# Patient Record
Sex: MALE | Race: WHITE | Employment: FULL TIME | URBAN - NONMETROPOLITAN AREA
[De-identification: names, ages, dates, MRNs, and addresses within clinical notes are randomized per-mention and may not be internally consistent; named-entity substitution may affect disease eponyms.]

---

## 2021-09-05 ENCOUNTER — HOSPITAL ENCOUNTER (EMERGENCY)
Age: 26
Discharge: HOME | End: 2021-09-05
Payer: COMMERCIAL

## 2021-09-05 VITALS
HEIGHT: 71 IN | SYSTOLIC BLOOD PRESSURE: 142 MMHG | TEMPERATURE: 99 F | OXYGEN SATURATION: 100 % | HEART RATE: 75 BPM | BODY MASS INDEX: 33.6 KG/M2 | DIASTOLIC BLOOD PRESSURE: 89 MMHG | WEIGHT: 240 LBS | RESPIRATION RATE: 18 BRPM

## 2021-09-05 DIAGNOSIS — L03.115 CELLULITIS OF RIGHT LOWER EXTREMITY: Primary | ICD-10-CM

## 2021-09-05 PROCEDURE — 6370000000 HC RX 637 (ALT 250 FOR IP): Performed by: NURSE PRACTITIONER

## 2021-09-05 PROCEDURE — 99283 EMERGENCY DEPT VISIT LOW MDM: CPT | Performed by: NURSE PRACTITIONER

## 2021-09-05 RX ORDER — IBUPROFEN 800 MG/1
800 TABLET ORAL EVERY 8 HOURS PRN
Qty: 30 TABLET | Refills: 0 | Status: SHIPPED | OUTPATIENT
Start: 2021-09-05

## 2021-09-05 RX ORDER — CEPHALEXIN 250 MG/1
500 CAPSULE ORAL ONCE
Status: COMPLETED | OUTPATIENT
Start: 2021-09-05 | End: 2021-09-05

## 2021-09-05 RX ORDER — SULFAMETHOXAZOLE AND TRIMETHOPRIM 800; 160 MG/1; MG/1
1 TABLET ORAL 2 TIMES DAILY
Qty: 20 TABLET | Refills: 0 | Status: SHIPPED | OUTPATIENT
Start: 2021-09-05 | End: 2021-09-15

## 2021-09-05 RX ORDER — SULFAMETHOXAZOLE AND TRIMETHOPRIM 800; 160 MG/1; MG/1
1 TABLET ORAL ONCE
Status: COMPLETED | OUTPATIENT
Start: 2021-09-05 | End: 2021-09-05

## 2021-09-05 RX ORDER — TRAMADOL HYDROCHLORIDE 50 MG/1
50 TABLET ORAL EVERY 6 HOURS PRN
Qty: 10 TABLET | Refills: 0 | Status: SHIPPED | OUTPATIENT
Start: 2021-09-05 | End: 2021-09-08

## 2021-09-05 RX ORDER — CEPHALEXIN 500 MG/1
500 CAPSULE ORAL 4 TIMES DAILY
Qty: 28 CAPSULE | Refills: 0 | Status: SHIPPED | OUTPATIENT
Start: 2021-09-05 | End: 2021-09-12

## 2021-09-05 RX ADMIN — CEPHALEXIN 500 MG: 250 CAPSULE ORAL at 23:53

## 2021-09-05 RX ADMIN — SULFAMETHOXAZOLE AND TRIMETHOPRIM 1 TABLET: 800; 160 TABLET ORAL at 23:53

## 2021-09-05 ASSESSMENT — PAIN DESCRIPTION - DESCRIPTORS: DESCRIPTORS: TIGHTNESS

## 2021-09-05 ASSESSMENT — PAIN DESCRIPTION - ORIENTATION: ORIENTATION: LOWER

## 2021-09-05 ASSESSMENT — PAIN DESCRIPTION - PAIN TYPE: TYPE: ACUTE PAIN

## 2021-09-05 ASSESSMENT — PAIN SCALES - GENERAL: PAINLEVEL_OUTOF10: 6

## 2021-09-05 ASSESSMENT — PAIN DESCRIPTION - LOCATION: LOCATION: LEG

## 2021-09-06 NOTE — ED TRIAGE NOTES
Pt presents to the ED through triage with c/c insect bite on right lower leg. Pt states the he first noticed the pain yesterday but today, noticed the swelling and tightness. Pt rates pain 6/10. Vitals stable.
Samples Given: CeraVe moisturizer and cleanser
Detail Level: Zone

## 2021-09-09 ASSESSMENT — ENCOUNTER SYMPTOMS
BACK PAIN: 0
VOMITING: 0
EYE REDNESS: 0
ABDOMINAL PAIN: 0
COUGH: 0
COLOR CHANGE: 1
NAUSEA: 0
RHINORRHEA: 0
CHEST TIGHTNESS: 0

## 2021-09-09 NOTE — ED PROVIDER NOTES
[facility] Emergency Department    CHIEF COMPLAINT       Chief Complaint   Patient presents with   Nedra Felix 82       Nurses Notes reviewed and I agree except as noted in the HPI. HISTORY OF PRESENT ILLNESS    Mariano Luis casimiro 31 y.o. male who presents to the ED for evaluation of redness and discomfort to the right shin. The patient is thought maybe he got bit by something because the area is red and warm. No fevers or chills. Some pain in the area. HPI was provided by the patient    REVIEW OF SYSTEMS     Review of Systems   Constitutional: Negative for chills, fatigue and fever. HENT: Negative for congestion, ear discharge, ear pain, postnasal drip and rhinorrhea. Eyes: Negative for redness. Respiratory: Negative for cough and chest tightness. Cardiovascular: Positive for leg swelling. Negative for chest pain. Gastrointestinal: Negative for abdominal pain, nausea and vomiting. Genitourinary: Negative for difficulty urinating, dysuria, enuresis, flank pain and hematuria. Musculoskeletal: Negative for back pain and joint swelling. Skin: Positive for color change and wound. Negative for rash. Neurological: Negative for dizziness, light-headedness, numbness and headaches. Psychiatric/Behavioral: Negative for agitation, behavioral problems and confusion. All other systems negative except as noted. PAST MEDICAL HISTORY     Past Medical History:   Diagnosis Date    Pneumonia        SURGICALHISTORY      has a past surgical history that includes Tympanostomy tube placement. CURRENT MEDICATIONS       Discharge Medication List as of 9/5/2021 11:44 PM      CONTINUE these medications which have NOT CHANGED    Details   Phenylephrine-DM-GG (ROBITUSSIN COUGH/COLD CF) 5- MG/5ML LIQD 2 teaspoon every 6 hours when necessary, Disp-120 mL, R-0             ALLERGIES     has No Known Allergies. FAMILY HISTORY     has no family status information on file.     family history is not on file. SOCIAL HISTORY       Social History     Socioeconomic History    Marital status: Single     Spouse name: Not on file    Number of children: Not on file    Years of education: Not on file    Highest education level: Not on file   Occupational History    Not on file   Tobacco Use    Smoking status: Never Smoker   Substance and Sexual Activity    Alcohol use: No    Drug use: No    Sexual activity: Yes   Other Topics Concern    Not on file   Social History Narrative    Not on file     Social Determinants of Health     Financial Resource Strain:     Difficulty of Paying Living Expenses:    Food Insecurity:     Worried About Running Out of Food in the Last Year:     [address] in the Last Year:    Transportation Needs:     Lack of Transportation (Medical):  Lack of Transportation (Non-Medical):    Physical Activity:     Days of Exercise per Week:     Minutes of Exercise per Session:    Stress:     Feeling of Stress :    Social Connections:     Frequency of Communication with Friends and Family:     Frequency of Social Gatherings with Friends and Family:     Attends Latter day Services:     Active Member of Clubs or Organizations:     Attends Club or Organization Meetings:     Marital Status:    Intimate Partner Violence:     Fear of Current or Ex-Partner:     Emotionally Abused:     Physically Abused:     Sexually Abused:        PHYSICAL EXAM     INITIAL VITALS:  height is 5' 11\" (1.803 m) and weight is 240 lb (108.9 kg). His oral temperature is 99 °F (37.2 °C). His blood pressure is 142/89 (abnormal) and his pulse is 75. His respiration is 18 and oxygen saturation is 100%. Physical Exam  Vitals and nursing note reviewed. Constitutional:       General: He is not in acute distress. Appearance: He is well-developed. He is not diaphoretic. HENT:      Head: Normocephalic and atraumatic.       Nose: Nose normal.      Mouth/Throat:      Mouth: Mucous membranes are moist.      Pharynx: Oropharynx is clear. Eyes:      General:         Right eye: No discharge. Left eye: No discharge. Conjunctiva/sclera: Conjunctivae normal.   Neck:      Trachea: No tracheal deviation. Cardiovascular:      Rate and Rhythm: Normal rate and regular rhythm. Heart sounds: Normal heart sounds. No murmur heard. No gallop. Comments: Normal capillary refill  Pulmonary:      Effort: Pulmonary effort is normal. No respiratory distress. Breath sounds: Normal breath sounds. No stridor. Abdominal:      General: Bowel sounds are normal.      Palpations: Abdomen is soft. Musculoskeletal:         General: Swelling and tenderness present. No deformity. Normal range of motion. Cervical back: Normal range of motion. Skin:     General: Skin is warm and dry. Capillary Refill: Capillary refill takes less than 2 seconds. Coloration: Skin is not pale. Findings: Erythema present. No rash. Neurological:      General: No focal deficit present. Mental Status: He is alert and oriented to person, place, and time. Cranial Nerves: No cranial nerve deficit. Psychiatric:         Mood and Affect: Mood normal.         Behavior: Behavior normal.         DIFFERENTIAL DIAGNOSIS:   Cellulitis, abscess, less likely sepsis    DIAGNOSTIC RESULTS     EKG: All EKG's are interpreted by the Emergency Department Physician who eithersigns or Co-signs this chart in the absence of a cardiologist.        RADIOLOGY: non-plainfilm images(s) such as CT, Ultrasound and MRI are read by the radiologist.  Plain radiographic images are visualized and preliminarily interpreted by the emergency physician unless otherwise stated below.   No orders to display         LABS:   Labs Reviewed - No data to display    EMERGENCY DEPARTMENT COURSE:   Vitals:    Vitals:    09/05/21 2314   BP: (!) 142/89   Pulse: 75   Resp: 18   Temp: 99 °F (37.2 °C)   TempSrc: Oral   SpO2: 100%   Weight: 240 lb (108.9 kg)   Height: 5' 11\" (1.803 m)         [facility]    Patient was seen and evaluated in the emergency department, patient appeared to be in stable condition. Vital signs assessed, no abnormality noted. Physical exam completed. Patient has redness and warmth to the anterior surface of the right lower leg. No abscess is noted. There is a small abrasion on the lateral aspect that is likely the source of infection. Patient is treated with keflex and bactrim. He is discharged home with oral abx. He has no signs of sepsis. He is stable for dc. Medications   cephALEXin (KEFLEX) capsule 500 mg (500 mg Oral Given 9/5/21 2353)   sulfamethoxazole-trimethoprim (BACTRIM DS;SEPTRA DS) 800-160 MG per tablet 1 tablet (1 tablet Oral Given 9/5/21 2353)         Patient was seen independently by myself. The patient's final impression and disposition and plan was determined by myself. Strict return precautions and follow up instructions were discussed with the patient prior to discharge, with which the patient agrees. Physical assessment findings, diagnostic testing(s) if applicable, and vital signs reviewed with patient/patient representative. Questions answered. Medications asdirected, including OTC medications for supportive care. Education provided on medications. Differential diagnosis(s) discussed with patient/patient representative. Home care/self care instructions reviewed withpatient/patient representative. Patient is to follow-up with family care provider in 2-3 days if no improvement. Patient is to go to the emergency department if symptoms worsen. Patient/patient representative isaware of care plan, questions answered, verbalizes understanding and is in agreement. CRITICAL CARE:   None    CONSULTS:  None    PROCEDURES:  None    FINAL IMPRESSION     1.  Cellulitis of right lower extremity          DISPOSITION/PLAN   DISPOSITION Decision To Discharge 09/05/2021 11:28:15 PM      PATIENT REFERREDTO:  FERDINAND Ledbetter CNP  [address]  [phone]    Schedule an appointment as soon as possible for a visit in 2 days  For follow up      DISCHARGE MEDICATIONS:  Discharge Medication List as of 9/5/2021 11:44 PM      START taking these medications    Details   cephALEXin (KEFLEX) 500 MG capsule Take 1 capsule by mouth 4 times daily for 7 days, Disp-28 capsule, R-0Print      sulfamethoxazole-trimethoprim (BACTRIM DS) 800-160 MG per tablet Take 1 tablet by mouth 2 times daily for 10 days, Disp-20 tablet, R-0Print      traMADol (ULTRAM) 50 MG tablet Take 1 tablet by mouth every 6 hours as needed for Pain for up to 3 days. Intended supply: 3 days.  Take lowest dose possible to manage pain, Disp-10 tablet, R-0Print             (Please note that portions of this note were completed with a voice recognition program.  Efforts were made to edit the dictations but occasionally words are mis-transcribed.)         FERDINAND Mays CNP, APRN - CNP  09/09/21 5315

## 2022-04-13 ENCOUNTER — HOSPITAL ENCOUNTER (EMERGENCY)
Age: 27
Discharge: HOME | End: 2022-04-13
Attending: EMERGENCY MEDICINE
Payer: COMMERCIAL

## 2022-04-13 VITALS
SYSTOLIC BLOOD PRESSURE: 116 MMHG | HEART RATE: 98 BPM | TEMPERATURE: 98.9 F | DIASTOLIC BLOOD PRESSURE: 68 MMHG | RESPIRATION RATE: 17 BRPM | OXYGEN SATURATION: 99 %

## 2022-04-13 DIAGNOSIS — K91.840 SURGICAL WOUND HEMORRHAGE AFTER DENTAL PROCEDURE: Primary | ICD-10-CM

## 2022-04-13 DIAGNOSIS — Z98.818 SURGICAL WOUND HEMORRHAGE AFTER DENTAL PROCEDURE: Primary | ICD-10-CM

## 2022-04-13 DIAGNOSIS — Z98.818 S/P WISDOM TOOTH EXTRACTION: ICD-10-CM

## 2022-04-13 PROCEDURE — 99282 EMERGENCY DEPT VISIT SF MDM: CPT

## 2022-04-13 ASSESSMENT — ENCOUNTER SYMPTOMS
VOMITING: 0
BACK PAIN: 0
SHORTNESS OF BREATH: 0
ABDOMINAL PAIN: 0
COUGH: 0

## 2022-04-14 NOTE — ED PROVIDER NOTES
[facility] [address] [address] EMERGENCY DEPT    EMERGENCY MEDICINE     Pt Name: Asif Astudillo  MRN: [id number]  [place] 1995  Date of evaluation: 4/13/2022  Provider: Euel Paget, MD    CHIEF COMPLAINT       Chief Complaint   Patient presents with    Dental Problem     had molars pulled today       HISTORY OF PRESENT ILLNESS    Asif Astudillo is a pleasant 31 y.o. male   Presents to the emergency department from home bleeding from gums where he had his wisdom teeth removed today. He had the b/l lower wisdom teeth removed. He reports its more sore on the L. He has been trying to use gauze to stop the bleeding without relief. No other complaints, no other known exacerbating/relieving factors. Triage notes and Nursing notes were reviewed by myself. Any discrepancies are addressed above. PAST MEDICAL HISTORY     Past Medical History:   Diagnosis Date    Pneumonia        SURGICAL HISTORY       Past Surgical History:   Procedure Laterality Date    TYMPANOSTOMY TUBE PLACEMENT         CURRENT MEDICATIONS       Previous Medications    IBUPROFEN (ADVIL;MOTRIN) 800 MG TABLET    Take 1 tablet by mouth every 8 hours as needed for Pain    PHENYLEPHRINE-DM-GG (ROBITUSSIN COUGH/COLD CF) 5- MG/5ML LIQD    2 teaspoon every 6 hours when necessary       ALLERGIES     Patient has no known allergies. FAMILY HISTORY     No family history on file.      SOCIAL HISTORY       Social History     Socioeconomic History    Marital status: Single     Spouse name: Not on file    Number of children: Not on file    Years of education: Not on file    Highest education level: Not on file   Occupational History    Not on file   Tobacco Use    Smoking status: Never Smoker    Smokeless tobacco: Not on file   Substance and Sexual Activity    Alcohol use: No    Drug use: No    Sexual activity: Yes   Other Topics Concern    Not on file   Social History Narrative    Not on file     Social Determinants of Health     Financial Resource Strain:     Difficulty of Paying Living Expenses: Not on file   Food Insecurity:     Worried About Running Out of Food in the Last Year: Not on file    Shelley of Food in the Last Year: Not on file   Transportation Needs:     Lack of Transportation (Medical): Not on file    Lack of Transportation (Non-Medical): Not on file   Physical Activity:     Days of Exercise per Week: Not on file    Minutes of Exercise per Session: Not on file   Stress:     Feeling of Stress : Not on file   Social Connections:     Frequency of Communication with Friends and Family: Not on file    Frequency of Social Gatherings with Friends and Family: Not on file    Attends Scientology Services: Not on file    Active Member of [address] [place] [address] [email or web address] or Organizations: Not on file    Attends Club or Organization Meetings: Not on file    Marital Status: Not on file   Intimate Partner Violence:     Fear of Current or Ex-Partner: Not on file    Emotionally Abused: Not on file    Physically Abused: Not on file    Sexually Abused: Not on file   Housing Stability:     Unable to Pay for Housing in the Last Year: Not on file    Number of [place] in the Last Year: Not on file    Unstable Housing in the Last Year: Not on file       REVIEW OF SYSTEMS     Review of Systems   Constitutional: Negative for chills and fever. Respiratory: Negative for cough and shortness of breath. Cardiovascular: Negative for chest pain and leg swelling. Gastrointestinal: Negative for abdominal pain and vomiting. Musculoskeletal: Negative for back pain and neck pain. Skin: Negative for rash and wound. Neurological: Negative for weakness and numbness. All other systems reviewed and are negative. Except as noted above the remainder of the review of systems was reviewed and is.    PHYSICAL EXAM    (up to 7 for level 4, 8 or more for level 5)     ED Triage Vitals [04/13/22 2005]   BP Temp Temp src Pulse Resp SpO2 Height Weight   114/63 -- -- 107 18 99 % -- --       Physical Exam  Vitals and nursing note reviewed. Constitutional:       General: He is awake. HENT:      Head: Normocephalic and atraumatic. Mouth/Throat:      Mouth: Mucous membranes are moist.      Pharynx: Oropharynx is clear. Uvula midline. No pharyngeal swelling. Comments: No swelling  Eyes:      General: Lids are normal.      Conjunctiva/sclera: Conjunctivae normal.   Neck:      Vascular: No JVD. Trachea: No tracheal deviation. Cardiovascular:      Rate and Rhythm: Normal rate and regular rhythm. Pulses: Normal pulses. Heart sounds: No murmur heard. No friction rub. No gallop. Pulmonary:      Effort: Pulmonary effort is normal.      Breath sounds: Normal breath sounds. No wheezing, rhonchi or rales. Abdominal:      Palpations: Abdomen is soft. Tenderness: There is no abdominal tenderness. Musculoskeletal:      Cervical back: Neck supple. Skin:     General: Skin is warm. Findings: No rash. Neurological:      General: No focal deficit present. Mental Status: He is alert. Sensory: No sensory deficit. Motor: No weakness. Psychiatric:         Behavior: Behavior is cooperative. DIAGNOSTIC RESULTS     EKG:(none if blank)  All EKG's are interpreted by [facility] Phys[facility] who either signs or Co-signs this chart in the absence of a cardiologist.        RADIOLOGY: (none if blank)   Interpretation per the Radiologistbelow, if available at the time of this note:    No orders to display       LABS:  Labs Reviewed - No data to display    All other labs were within normal range or not returned as of this dictation. Please note, any cultures that may have been sent were not resulted at the time of this patient visit. EMERGENCY DEPARTMENT COURSE andMedical Decision Making:     MDM/   Pt presents to the ER with uncontrolled bleeding after dental extraction. Quickclot (kaolin) gauze applied.   Pt observed here for 1.5 hours post application, bleeding remains stopped, no further bleeding. Pt stable for dc home, counseled regarding need to call his dentist first thing in the morning. Pt also counseled regarding ER return precautions. Strict returnprecautions and follow up instructions were discussed with the patient with which the patient agrees      ED Medications administered this visit:  Medications - No data to display      Procedures: (None if blank)         CLINICAL IMPRESSION     1. Surgical wound hemorrhage after dental procedure    2.  S/P wisdom tooth extraction          DISPOSITION/PLAN   DISPOSITION Decision To Discharge 04/13/2022 10:02:34 PM      PATIENT REFERRED TO:  [facility]  [address]  [phone]        your dentist tomorrow    In 1 day        DISCHARGE MEDICATIONS:  New Prescriptions    No medications on file           (Please note that portions of this note were completed with a voice recognition program.  Efforts were made to edit the dictations but occasionallywords are mis-transcribed.)      Marlen Magana MD,FACEP (electronically signed)  Attending Physician, Emergency Department        Mike Arambula MD  04/13/22 8348

## 2022-04-14 NOTE — ED NOTES
Patient resting in bed. Respirations easy and unlabored. No distress noted. Call light within reach. Bleed appears to be controlled at this time.      Sebastian Gu RN  04/13/22 2459

## 2022-04-14 NOTE — ED NOTES
Pt to the ED via self. Patient presents with complaints of dental bleeding. Patient states that he had work done earlier today around 3 and has been bleeding since. Gauze packed as gums are bleeding. Patient is alert and oriented x 4. Respirations are regular and unlabored. Family at the bedside and call light within reach.      Heather Walker RN  04/13/22 2007